# Patient Record
(demographics unavailable — no encounter records)

---

## 2018-05-10 NOTE — [email or web address].PDOCBH
ED HPI GENERAL MEDICAL PROBLEM





- General


Chief Complaint: Behavioral/Psych


Stated Complaint: WELLNESS CHECK


Time Seen by Provider: 05/10/18 18:55


Source of Information: Reports: Patient, Police


History Limitations: Reports: Intoxication





- History of Present Illness


INITIAL COMMENTS - FREE TEXT/NARRATIVE: 





The patient comes with police for medical clearance.  He was found trying to 

get into some people's houses.  He will be going to intermediate but it appears he is 

intoxicated or coming off of something.  He is drowsy.  He does admit to me 

that he has been drinking.  He does not admit to taking drugs.  He is drowsy 

and hard to keep on track with questions.  He denies any medical problems.  He 

has no complaints at this time such as fever, chills, cough, chest pain, 

shortness of breath, abdominal pain, nausea or vomiting.


Onset: Gradual


Duration: Hour(s):


Severity: Moderate


Improves with: Reports: None


Worsens with: Reports: None


Context: Reports: Activity (He was caught trying to enter some houses in town)


Associated Symptoms: Reports: No Other Symptoms





- Related Data


 Allergies











Allergy/AdvReac Type Severity Reaction Status Date / Time


 


No Known Allergies Allergy   Verified 05/10/18 18:29











Home Meds: 


 Home Meds





. [No Known Home Meds]  05/10/18 [History]











Past Medical History





- Past Health History


Medical/Surgical History: Denies Medical/Surgical History





Social & Family History





- Tobacco Use


Smoking Status *Q: Current Every Day Smoker


Years of Tobacco use: 20


Packs/Tins Daily: 1





ED ROS GENERAL





- Review of Systems


Review Of Systems: See Below


Constitutional: Reports: No Symptoms


HEENT: Reports: No Symptoms


Respiratory: Reports: No Symptoms


Cardiovascular: Reports: No Symptoms


Endocrine: Reports: No Symptoms


GI/Abdominal: Reports: No Symptoms


: Reports: No Symptoms


Musculoskeletal: Reports: No Symptoms


Skin: Reports: No Symptoms


Neurological: Reports: Other (Drowsy)





ED EXAM, BEHAVIORAL HEALTH





- Physical Exam


Exam: See Below


Exam Limited By: Intoxication


General Appearance: No Apparent Distress, Other (Drowsy)


Ears: Normal External Exam


Nose: Normal Inspection


Head: Atraumatic, Normocephalic


Neck: Normal Inspection


Respiratory/Chest: No Respiratory Distress, Lungs Clear, Normal Breath Sounds


Cardiovascular: Regular Rate, Rhythm, No Edema, No Murmur


GI/Abdominal: Soft, Non-Tender, No Organomegaly, No Mass


Extremities: Normal Inspection


Neurological: Other (Drowsy but he will answer questions and follow commands)





COURSE, BEHAVIORAL HEALTH COMP





- Course


Vital Signs: 


 Last Vital Signs











Temp  97.8 F   05/10/18 18:29


 


Pulse  100   05/10/18 18:29


 


Resp  20   05/10/18 18:29


 


BP  125/110 H  05/10/18 18:29


 


Pulse Ox  96   05/10/18 18:29











Orders, Labs, Meds: 


 Active Orders 24 hr











 Category Date Time Status


 


 Cardiac Monitoring [RC] .AS DIRECTED Care  05/10/18 19:02 Active








 Laboratory Tests











  05/10/18 05/10/18 Range/Units





  19:20 19:20 


 


WBC  6.27   (4.23-9.07)  K/mm3


 


RBC  4.77   (4.63-6.08)  M/mm3


 


Hgb  15.4   (13.7-17.5)  gm/L


 


Hct  44.9   (40.1-51.0)  %


 


MCV  94.1 H   (79.0-92.2)  fl


 


MCH  32.3 H   (25.7-32.2)  pg


 


MCHC  34.3   (32.2-35.5)  g/dl


 


RDW Std Deviation  43.5   (35.1-43.9)  fL


 


Plt Count  212   (163-337)  K/mm3


 


MPV  10.8   (9.4-12.3)  fl


 


Neut % (Auto)  50.3   (34.0-67.9)  %


 


Lymph % (Auto)  36.7   (21.8-53.1)  %


 


Mono % (Auto)  10.8   (5.3-12.2)  %


 


Eos % (Auto)  1.8   (0.8-7.0)  


 


Baso % (Auto)  0.2   (0.1-1.2)  %


 


Neut # (Auto)  3.16   (1.78-5.38)  K/mm3


 


Lymph # (Auto)  2.30   (1.32-3.57)  K/mm3


 


Mono # (Auto)  0.68   (0.30-0.82)  K/mm3


 


Eos # (Auto)  0.11   (0.04-0.54)  K/mm3


 


Baso # (Auto)  0.01   (0.01-0.08)  K/mm3


 


Sodium   141  (136-145)  mEq/L


 


Potassium   4.5  (3.5-5.1)  mEq/L


 


Chloride   103  ()  mEq/L


 


Carbon Dioxide   28  (21-32)  mEq/L


 


Anion Gap   14.5  (5-15)  


 


BUN   24 H  (7-18)  mg/dL


 


Creatinine   1.0  (0.7-1.3)  mg/dL


 


Est Cr Clr Drug Dosing   96.90  mL/min


 


Estimated GFR (MDRD)   > 60  (>60)  mL/min


 


BUN/Creatinine Ratio   24.0 H  (14-18)  


 


Glucose   89  ()  mg/dL


 


Calcium   9.1  (8.5-10.1)  mg/dL


 


Total Bilirubin   0.5  (0.2-1.0)  mg/dL


 


AST   38 H  (15-37)  U/L


 


ALT   89 H  (16-63)  U/L


 


Alkaline Phosphatase   64  ()  U/L


 


Total Protein   7.8  (6.4-8.2)  g/dl


 


Albumin   4.4  (3.4-5.0)  g/dl


 


Globulin   3.4  gm/dL


 


Albumin/Globulin Ratio   1.3  (1-2)  


 


TSH 3rd Generation   0.415  (0.358-3.74)  uIU/mL


 


Ethyl Alcohol   0.00  (0.00)  gm%











Re-Assessment/Re-Exam: 





I ordered labs, ETOH and urine drug screen.





His CBC looks good.  His AST and ALT are just slightly elevated.  His ETOH is 

0.  He cannot give us a urine sample yet.  We will try again.





We could not get a cath specimen.  I feel he is coming off of something.  He 

denies taking anything.  I feel he is safe to go to the [facility].





Departure





- Departure


Time of Disposition: 20:35


Disposition: Home, Self-Care 01


Condition: Good


Clinical Impression: 


 Drug abuse, Encounter for medical screening examination








- Discharge Information


Referrals: 


PCP,None [Primary Care Provider] - 


Forms:  ED Department Discharge


Additional Instructions: 


A medical screening exam was done and you are medically cleared to go to the 

[facility].





- My Orders


Last 24 Hours: 


My Active Orders





05/10/18 19:02


Cardiac Monitoring [RC] .AS DIRECTED 














- Assessment/Plan


Last 24 Hours: 


My Active Orders





05/10/18 19:02


Cardiac Monitoring [RC] .AS DIRECTED